# Patient Record
Sex: MALE | Race: ASIAN | HISPANIC OR LATINO
[De-identification: names, ages, dates, MRNs, and addresses within clinical notes are randomized per-mention and may not be internally consistent; named-entity substitution may affect disease eponyms.]

---

## 2019-05-06 ENCOUNTER — APPOINTMENT (OUTPATIENT)
Dept: UROLOGY | Facility: CLINIC | Age: 54
End: 2019-05-06
Payer: COMMERCIAL

## 2019-05-06 VITALS
BODY MASS INDEX: 24.41 KG/M2 | TEMPERATURE: 98.1 F | WEIGHT: 143 LBS | HEIGHT: 64 IN | HEART RATE: 78 BPM | SYSTOLIC BLOOD PRESSURE: 119 MMHG | DIASTOLIC BLOOD PRESSURE: 69 MMHG

## 2019-05-06 DIAGNOSIS — Z80.9 FAMILY HISTORY OF MALIGNANT NEOPLASM, UNSPECIFIED: ICD-10-CM

## 2019-05-06 DIAGNOSIS — R39.12 POOR URINARY STREAM: ICD-10-CM

## 2019-05-06 LAB
ANION GAP SERPL CALC-SCNC: 13 MMOL/L
BUN SERPL-MCNC: 17 MG/DL
CALCIUM SERPL-MCNC: 9.1 MG/DL
CHLORIDE SERPL-SCNC: 106 MMOL/L
CO2 SERPL-SCNC: 25 MMOL/L
CREAT SERPL-MCNC: 1.05 MG/DL
GLUCOSE SERPL-MCNC: 91 MG/DL
POTASSIUM SERPL-SCNC: 4 MMOL/L
PSA FREE FLD-MCNC: 14 %
PSA FREE SERPL-MCNC: 0.43 NG/ML
PSA SERPL-MCNC: 3.05 NG/ML
SODIUM SERPL-SCNC: 144 MMOL/L

## 2019-05-06 PROCEDURE — 99204 OFFICE O/P NEW MOD 45 MIN: CPT

## 2019-05-06 NOTE — PHYSICAL EXAM
[General Appearance - Well Nourished] : well nourished [General Appearance - Well Developed] : well developed [General Appearance - In No Acute Distress] : no acute distress [Normal Appearance] : normal appearance [Well Groomed] : well groomed [Edema] : no peripheral edema [Respiration, Rhythm And Depth] : normal respiratory rhythm and effort [Exaggerated Use Of Accessory Muscles For Inspiration] : no accessory muscle use [Abdomen Tenderness] : non-tender [Costovertebral Angle Tenderness] : no ~M costovertebral angle tenderness [Abdomen Soft] : soft [Urinary Bladder Findings] : the bladder was normal on palpation [Urethral Meatus] : meatus normal [Scrotum] : the scrotum was normal [Testes Mass (___cm)] : there were no testicular masses [No Prostate Nodules] : no prostate nodules [Normal Station and Gait] : the gait and station were normal for the patient's age [] : no rash [Oriented To Time, Place, And Person] : oriented to person, place, and time [No Focal Deficits] : no focal deficits [Affect] : the affect was normal [Mood] : the mood was normal [Not Anxious] : not anxious [No Palpable Adenopathy] : no palpable adenopathy

## 2019-05-11 NOTE — ADDENDUM
[FreeTextEntry1] : Entered by Annia Corona, acting as scribe for Dr. Rothman.\par  \par The documentation recorded by the scribe accurately reflects the service I personally performed and the decisions made by me.\par

## 2019-05-11 NOTE — LETTER BODY
[FreeTextEntry1] : No PCP\par \par Reason for Visit: BPH.\par \par This is a 54-year-old gentleman with symptoms of BPH. Patient is here today for evaluation. Patient reports he has weak uroflow, frequency, and hesitancy. Patient reports symptoms have worsened over the last month, with slower stream and worse urgency. He reports nocturia 5-10x per night. Patient reports requiring double or triple voiding at times. He denies any hematuria or urinary incontinence. His symptoms are aggravated by hydration. He denies any alleviating factors. He has been on Tamsulosin for the past 4-5 years, without improvement. He reports no pain. All other review of systems are negative. Patient reports prostate cancer on paternal side of family. All other past medical history, family history, and social history were inquired and were noncontributory to patient current condition. Medications and allergies were reviewed.\par \par On examination, the patient is a healthy-appearing gentleman in no acute distress. He is alert and oriented follows commands. He has normal mood and affect. He is normocephalic. Neck is supple. Oral no thrush Respirations are unlabored. His abdomen is soft and nontender. Bladder is nonpalpable. No CVA tenderness. Neurologically he is grossly intact. No peripheral edema. Skin without gross abnormality. He has normal male external genitalia. Normal meatus. Bilateral testes are descended intrascrotally and normal to palpation. On rectal examination, there is normal sphincter tone. The prostate is clinically benign without focal induration or nodularity.\par \par Post-void residual on bladder scan today was 55 cc.\par \par ASSESSMENT: BPH\par \par I counseled the patient on the various etiology of his symptoms. I discussed the natural history of BPH and the treatment options available. I discussed the options of conservative management with fluid in dietary restrictions, herbal therapy, medical therapy, and minimally invasive procedures.  Risk and benefits were discussed. I answered his questions. Patient will proceed with urodynamics and cystoscopy. Patient will obtain PSA and BMP. I recommended he continue Flomax, which I renewed today. I also recommend patient try Proscar. I discussed the potential side effects of the medication. I counseled the patient on its use and side effects. If the patient develops any side effects, the patient will discontinue the medication and contact me. Risks and alternatives were discussed. I answered the patient questions. The patient will follow-up as directed and will contact me with any questions or concerns. \par \par Plan: Urodynamics with cystoscopy. PSA. BMP. Continue Flomax. Trial of Proscar. Followup 1 month

## 2019-06-20 ENCOUNTER — APPOINTMENT (OUTPATIENT)
Dept: UROLOGY | Facility: CLINIC | Age: 54
End: 2019-06-20
Payer: COMMERCIAL

## 2019-06-20 ENCOUNTER — OUTPATIENT (OUTPATIENT)
Dept: OUTPATIENT SERVICES | Facility: HOSPITAL | Age: 54
LOS: 1 days | End: 2019-06-20
Payer: COMMERCIAL

## 2019-06-20 DIAGNOSIS — R35.0 FREQUENCY OF MICTURITION: ICD-10-CM

## 2019-06-20 PROCEDURE — 52000 CYSTOURETHROSCOPY: CPT

## 2019-06-20 PROCEDURE — 51798 US URINE CAPACITY MEASURE: CPT

## 2019-06-20 PROCEDURE — 51797 INTRAABDOMINAL PRESSURE TEST: CPT | Mod: 26

## 2019-06-20 PROCEDURE — 51728 CYSTOMETROGRAM W/VP: CPT | Mod: 26

## 2019-06-20 PROCEDURE — 51784 ANAL/URINARY MUSCLE STUDY: CPT | Mod: 26

## 2019-06-20 PROCEDURE — 51741 ELECTRO-UROFLOWMETRY FIRST: CPT

## 2019-06-20 PROCEDURE — 51797 INTRAABDOMINAL PRESSURE TEST: CPT

## 2019-06-20 PROCEDURE — 51741 ELECTRO-UROFLOWMETRY FIRST: CPT | Mod: 26

## 2019-06-20 PROCEDURE — 51784 ANAL/URINARY MUSCLE STUDY: CPT

## 2019-06-20 PROCEDURE — 99213 OFFICE O/P EST LOW 20 MIN: CPT | Mod: 25

## 2019-06-20 PROCEDURE — 51728 CYSTOMETROGRAM W/VP: CPT

## 2019-06-20 NOTE — LETTER BODY
[FreeTextEntry1] : No PCP\par \par Reason for Visit: BPH.\par \par This is a 54-year-old gentleman with symptoms of BPH. Patient reports he has weak uroflow, frequency, and hesitancy. Patient reports symptoms have worsened over the last month, with slower stream and worse urgency. He reports nocturia 5-10x per night. Patient reports requiring double or triple voiding at times. Patient returns today for UDS. Since his last visit, he reports taking Flomax BID and Proscar regularly without any side effects or difficulties with the medication. He reports weak urinary flow with medication. He denies any hematuria or urinary incontinence. His symptoms are aggravated by hydration. He denies any alleviating factors. He has been on Tamsulosin for the past 4-5 years, without improvement. He reports no pain. All other review of systems are negative. Patient reports prostate cancer on paternal side of family. All other past medical history, family history, and social history were inquired and were noncontributory to patient current condition. Medications and allergies were reviewed.\par \par On examination, the patient is a healthy-appearing gentleman in no acute distress. He is alert and oriented follows commands. He has normal mood and affect. He is normocephalic. Neck is supple. Oral no thrush Respirations are unlabored. His abdomen is soft and nontender. Bladder is nonpalpable. No CVA tenderness. Neurologically he is grossly intact. No peripheral edema. Skin without gross abnormality. He has normal male external genitalia. Normal meatus. Bilateral testes are descended intrascrotally and normal to palpation. On rectal examination, there is normal sphincter tone. The prostate is clinically benign without focal induration or nodularity.\par \par Post-void residual on bladder scan today was 60 cc.\par His UDS demonstrated median lobe hypertrophy.\par \par ASSESSMENT: BPH.\par \par I counseled the patient. His UDS demonstrated median lobe hypotrophy. His PVR today was 60 cc. He has persistent weak urinary flow. Patient declines surgery today. He will continue Flomax BID and Proscar as directed. I renewed his prescription for medication today. Risks and alternatives were discussed. I answered the patient questions. The patient will follow-up as directed and will contact me with any questions or concerns. Thank you for the opportunity to participate in the care of Mr. DIAS. I will keep you updated on his progress. \par \par Plan: Continue Flomax BID and Proscar. Followup 6 months.

## 2019-06-20 NOTE — ADDENDUM
[FreeTextEntry1] : Entered by Erica iRcci, acting as scribe for Dr. Kyle Rothman.\par \par The documentation recorded by the scribe accurately reflects the service I personally performed and the decisions made by me.

## 2019-06-22 DIAGNOSIS — N40.1 BENIGN PROSTATIC HYPERPLASIA WITH LOWER URINARY TRACT SYMPTOMS: ICD-10-CM

## 2019-06-22 DIAGNOSIS — R35.1 NOCTURIA: ICD-10-CM

## 2020-01-09 ENCOUNTER — APPOINTMENT (OUTPATIENT)
Dept: UROLOGY | Facility: CLINIC | Age: 55
End: 2020-01-09
Payer: COMMERCIAL

## 2020-01-09 PROCEDURE — 51798 US URINE CAPACITY MEASURE: CPT

## 2020-01-09 PROCEDURE — 99214 OFFICE O/P EST MOD 30 MIN: CPT | Mod: 25

## 2020-01-09 NOTE — ADDENDUM
[FreeTextEntry1] : Entered by Hank Law, acting as scribe for Dr. Kyle Rothman.\par \par The documentation recorded by the scribe accurately reflects the service I personally performed and the decisions made by me.

## 2020-01-09 NOTE — LETTER BODY
[FreeTextEntry1] : The patient does not have a PCP.\par \par Reason for Visit: BPH.\par \par This is a 54 year-old gentleman with BPH. His previous urodynamics testing with cystoscopy in June 2019 demonstrated median lobe hypertrophy. He returns today for follow up. Since his last visit, he continues to take Flomax BID and Proscar regularly without any side effects or difficulties with the medication. He reports of stable urinary symptoms with medication. He denies any hematuria or urinary incontinence. He has been on Tamsulosin for the past 4-5 years, without improvement. He has no pain. He denies any changes in health. The past medical history, family history and social history are unchanged. All other review of systems are negative. Patient denies any changes in medications. Medication list was reconciled. He has no allergies to medication.\par \par On examination, the patient is a healthy-appearing gentleman in no acute distress. He is alert and oriented follows commands. He has normal mood and affect. He is normocephalic. Neck is supple. Oral no thrush Respirations are unlabored. His abdomen is soft and nontender. Bladder is nonpalpable. No CVA tenderness. Neurologically he is grossly intact. No peripheral edema. Skin without gross abnormality. He has normal male external genitalia. Normal meatus. Bilateral testes are descended intrascrotally and normal to palpation. On rectal examination, there is normal sphincter tone. The prostate is clinically benign without focal induration or nodularity.\par \par His UDS in June 2019 demonstrated median lobe hypertrophy.\par \par Post-void residual on bladder scan today was 50 cc. \par \par ASSESSMENT: BPH, symptoms stable on Flomax BID and Proscar.\par \par I counseled the patient. His PVR today was 50 cc. He reports of stable urinary symptoms with medication. I renewed the patient's prescription for Flomax BID and Proscar today. I encouraged the patient to continue medications regularly as directed. Patient understands that if he develops gross hematuria or any urinary discomfort, he will contact me for further evaluation. Risks and alternatives were discussed. I answered the patient questions. He will obtain BMP and PSA today to ensure stability. The patient will follow-up as directed and will contact me with any questions or concerns. Thank you for the opportunity to participate in the care of Mr. DIAS. I will keep you updated on his progress. \par \par Plan: Continue Flomax BID and Proscar. BMP. PSA. Follow up in 1 year.

## 2020-01-10 LAB
ANION GAP SERPL CALC-SCNC: 11 MMOL/L
BUN SERPL-MCNC: 19 MG/DL
CALCIUM SERPL-MCNC: 9.5 MG/DL
CHLORIDE SERPL-SCNC: 104 MMOL/L
CO2 SERPL-SCNC: 26 MMOL/L
CREAT SERPL-MCNC: 0.99 MG/DL
GLUCOSE SERPL-MCNC: 98 MG/DL
POTASSIUM SERPL-SCNC: 4.1 MMOL/L
PSA FREE FLD-MCNC: 13 %
PSA FREE SERPL-MCNC: 0.17 NG/ML
PSA SERPL-MCNC: 1.34 NG/ML
SODIUM SERPL-SCNC: 141 MMOL/L

## 2020-04-25 ENCOUNTER — MESSAGE (OUTPATIENT)
Age: 55
End: 2020-04-25

## 2020-05-07 LAB
SARS-COV-2 IGG SERPL IA-ACNC: <0.1 INDEX
SARS-COV-2 IGG SERPL QL IA: NEGATIVE

## 2021-01-03 ENCOUNTER — TRANSCRIPTION ENCOUNTER (OUTPATIENT)
Age: 56
End: 2021-01-03

## 2021-01-07 ENCOUNTER — APPOINTMENT (OUTPATIENT)
Dept: UROLOGY | Facility: CLINIC | Age: 56
End: 2021-01-07
Payer: COMMERCIAL

## 2021-01-07 LAB
ANION GAP SERPL CALC-SCNC: 15 MMOL/L
BUN SERPL-MCNC: 18 MG/DL
CALCIUM SERPL-MCNC: 9.6 MG/DL
CHLORIDE SERPL-SCNC: 101 MMOL/L
CO2 SERPL-SCNC: 24 MMOL/L
CREAT SERPL-MCNC: 1.12 MG/DL
GLUCOSE SERPL-MCNC: 92 MG/DL
POTASSIUM SERPL-SCNC: 4 MMOL/L
PSA FREE FLD-MCNC: 15 %
PSA FREE SERPL-MCNC: 0.2 NG/ML
PSA SERPL-MCNC: 1.33 NG/ML
SODIUM SERPL-SCNC: 139 MMOL/L

## 2021-01-07 PROCEDURE — 99214 OFFICE O/P EST MOD 30 MIN: CPT | Mod: 25

## 2021-01-07 PROCEDURE — 99072 ADDL SUPL MATRL&STAF TM PHE: CPT

## 2021-01-07 PROCEDURE — 51798 US URINE CAPACITY MEASURE: CPT

## 2021-01-07 NOTE — ADDENDUM
[FreeTextEntry1] : Entered by Cristobal Andre, acting as scribe for Dr. Kyle Rothman.\par \par The documentation recorded by the scribe accurately reflects the service I personally performed and the decisions made by me.\par

## 2021-01-07 NOTE — LETTER BODY
[FreeTextEntry1] : Rainer Alaniz MD\par [address],\[place] [address]\par [phone]\par \par Dear Dr. Alaniz,\par \par Reason for Visit: BPH.\par \par This is a 55 year-old gentleman with chronic BPH. His urodynamics testing with cystoscopy in June 2019 demonstrated median lobe hypertrophy. The patient returns today for follow-up. Since he was last seen, the patient reports that he continues to take Flomax BID and Proscar regularly without any side effects or difficulties. The patient reports of persistent urinary symptoms despite medical therapy. He also notes weak uroflow. Patient expresses interest in UroLift procedure. He denies any hematuria or urinary incontinence. Patient has been on Tamsulosin for the past 4-5 years, without improvement. He denies any pain. Patient is single with no children. He expresses concern for his fertility. The patient denies any changes in health. The past medical history, family history and social history are unchanged. All other review of systems are negative. Patient denies any changes in medications. Medication list was reconciled. He has no allergies to medication.\par \par On examination, the patient is a healthy-appearing gentleman in no acute distress. He is alert and oriented follows commands. He has normal mood and affect. He is normocephalic. Neck is supple. Oral no thrush Respirations are unlabored. His abdomen is soft and nontender. Bladder is nonpalpable. No CVA tenderness. Neurologically he is grossly intact. No peripheral edema. Skin without gross abnormality. He has normal male external genitalia. Normal meatus. Bilateral testes are descended intrascrotally and normal to palpation. On rectal examination, there is normal sphincter tone. The prostate is clinically benign without focal induration or nodularity.\par \par Post-void residual on bladder scan today was 80 cc. \par \par ASSESSMENT: BPH, persistent symptoms on Flomax BID and Proscar.\par \par I counseled the patient. The patient's PVR today was 80 cc. In terms of his BPH, I recommended the patient continue taking Flomax BID and Proscar. I renewed the patient's prescription for Flomax and Proscar today. I encouraged the patient to continue medications regularly as directed. Given his interest in UroLift, I counseled the patient regarding the procedure. The risks and benefits were discussed. Alternatives were given. I answered the patient questions. The patient will consider the procedure. Risks and alternatives were discussed. I answered the patient questions. The patient will follow-up in 3 months and will contact me with any questions or concerns. Thank you for the opportunity to participate in the care of Mr. DIAS. I will keep you updated on his progress.\par \par Plan: Continue Flomax BID and Proscar. BMP. PSA. Consider UroLift. Follow-up in 3 months.

## 2021-04-15 ENCOUNTER — APPOINTMENT (OUTPATIENT)
Dept: UROLOGY | Facility: CLINIC | Age: 56
End: 2021-04-15
Payer: COMMERCIAL

## 2021-04-15 VITALS
SYSTOLIC BLOOD PRESSURE: 159 MMHG | RESPIRATION RATE: 15 BRPM | TEMPERATURE: 97.8 F | HEART RATE: 74 BPM | DIASTOLIC BLOOD PRESSURE: 87 MMHG

## 2021-04-15 PROCEDURE — 99072 ADDL SUPL MATRL&STAF TM PHE: CPT

## 2021-04-15 PROCEDURE — 99214 OFFICE O/P EST MOD 30 MIN: CPT

## 2021-04-15 RX ORDER — FINASTERIDE 5 MG/1
5 TABLET, FILM COATED ORAL DAILY
Qty: 90 | Refills: 3 | Status: DISCONTINUED | COMMUNITY
Start: 2019-05-06 | End: 2021-04-15

## 2021-04-15 NOTE — LETTER BODY
[FreeTextEntry1] : Rainer Alaniz MD\par [address],\[place] [address]\par [phone]\par \par Dear Dr. Alaniz,\par \par Reason for Visit: BPH.\par \par This is a 56 year-old gentleman with chronic BPH. His urodynamics testing with cystoscopy in June 2019 demonstrated median lobe hypertrophy. He returns today for follow-up. Since his last visit, the patient stopped taking Proscar, and he is only taking Flomax QD. Patient notes minimal changes in his urine flow, and urinary symptoms after making these changes. He notes stable urine flow and stable urinary symptoms on Flomax QD. He denies any pain, hematuria, or urinary incontinence. Patient has been on Tamsulosin for the past 4-5 years, without improvement. The patient denies any changes in health. The past medical history, family history and social history are unchanged. All other review of systems are negative. Patient denies any changes in medications. Medication list was reconciled. He has no allergies to medication.\par \par On examination, the patient is a healthy-appearing gentleman in no acute distress. He is alert and oriented follows commands. He has normal mood and affect. He is normocephalic. Neck is supple. Oral no thrush Respirations are unlabored. His abdomen is soft and nontender. Bladder is nonpalpable. No CVA tenderness. Neurologically he is grossly intact. No peripheral edema. Skin without gross abnormality. He has normal male external genitalia. Normal meatus. Bilateral testes are descended intrascrotally and normal to palpation. On rectal examination, there is normal sphincter tone. The prostate is clinically benign without focal induration or nodularity.\par \par His BMP demonstrated normal renal functions, creatinine 1.12. His PSA was 1.33, which is within normal limits.\par \par ASSESSMENT: BPH, stable symptoms on Flomax QD.\par \par I counseled the patient. In terms of his BPH, I recommended the patient continue taking Flomax QD. I renewed the patient's prescription for Flomax today. I encouraged the patient to continue medications regularly as directed. Patient understands that if he develops gross hematuria or any urinary discomfort, he will contact me for further evaluation. Risks and alternatives were discussed. I answered the patient questions. The patient will follow-up as directed and will contact me with any questions or concerns. Thank you for the opportunity to participate in the care of Mr. DIAS. I will keep you updated on his progress.\par \par Plan: Continue Flomax. Follow-up as directed.\par \par I spent 30-minutes time today on all issues related to this patient on today date of service including non face to face time.

## 2021-10-26 ENCOUNTER — NON-APPOINTMENT (OUTPATIENT)
Age: 56
End: 2021-10-26

## 2021-11-08 ENCOUNTER — APPOINTMENT (OUTPATIENT)
Dept: UROLOGY | Facility: CLINIC | Age: 56
End: 2021-11-08
Payer: COMMERCIAL

## 2021-11-08 DIAGNOSIS — R39.11 HESITANCY OF MICTURITION: ICD-10-CM

## 2021-11-08 LAB
ANION GAP SERPL CALC-SCNC: 14 MMOL/L
BUN SERPL-MCNC: 21 MG/DL
CALCIUM SERPL-MCNC: 9.2 MG/DL
CHLORIDE SERPL-SCNC: 104 MMOL/L
CO2 SERPL-SCNC: 23 MMOL/L
CREAT SERPL-MCNC: 1.06 MG/DL
GLUCOSE SERPL-MCNC: 84 MG/DL
POTASSIUM SERPL-SCNC: 4.3 MMOL/L
PSA FREE FLD-MCNC: 13 %
PSA FREE SERPL-MCNC: 0.31 NG/ML
PSA SERPL-MCNC: 2.41 NG/ML
SODIUM SERPL-SCNC: 141 MMOL/L

## 2021-11-08 PROCEDURE — 51798 US URINE CAPACITY MEASURE: CPT

## 2021-11-08 PROCEDURE — 99213 OFFICE O/P EST LOW 20 MIN: CPT

## 2021-11-08 NOTE — HISTORY OF PRESENT ILLNESS
[FreeTextEntry1] : Please refer to URO Consult note \par \par FUA BPH \par on Flomax \par stable symptoms \par PVR 30\par follow up in 1 year \par

## 2021-11-08 NOTE — LETTER BODY
[FreeTextEntry1] : Rainer Alaniz MD\par [address],\[place] [address]\par [phone]\par \par Dear Dr. Alaniz,\par \par Reason for Visit: BPH.\par \par This is a 56 year-old gentleman with chronic BPH. His urodynamics testing with cystoscopy in June 2019 demonstrated median lobe hypertrophy. He returns today for follow-up. Since his last visit, the patient resumed Proscar after developing progressive urinary symptoms. He is currently taking Flomax QD and Proscar without any side effects or difficulties. Patient notes improved urine flow, and urinary symptoms with both medications.  He denies any pain, hematuria, or urinary incontinence.  The patient denies any changes in health. The past medical history, family history and social history are unchanged. All other review of systems are negative. Patient denies any changes in medications. Medication list was reconciled. He has no allergies to medication.\par \par On examination, the patient is a healthy-appearing gentleman in no acute distress. He is alert and oriented follows commands. He has normal mood and affect. He is normocephalic. Neck is supple. Oral no thrush Respirations are unlabored. His abdomen is soft and nontender. Bladder is nonpalpable. No CVA tenderness. Neurologically he is grossly intact. No peripheral edema. Skin without gross abnormality. He has normal male external genitalia. Normal meatus. Bilateral testes are descended intrascrotally and normal to palpation. On rectal examination, there is normal sphincter tone. The prostate is clinically benign without focal induration or nodularity.\par \par His BMP from January demonstrated normal renal functions, creatinine 1.12. His PSA was 1.33, which is within normal limits.\par \par Post-void residual on bladder scan today was 30 cc. \par \par ASSESSMENT: BPH, stable symptoms on Flomax QD and Proscar. \par \par I counseled the patient. He is doing well. His PVR is 30 cc.  In terms of his BPH, the patient notes improved urinary symptoms on medical therapy. I recommended the patient continue taking Flomax QD and Proscar. I renewed the patient's prescription for Flomax today. I encouraged the patient to continue medications regularly as directed. He will repeat PSA and BMP today to ensure stability. Patient understands that if he develops gross hematuria or any urinary discomfort, he will contact me for further evaluation. Risks and alternatives were discussed. I answered the patient questions. The patient will follow-up as directed and will contact me with any questions or concerns. Thank you for the opportunity to participate in the care of Mr. DIAS. I will keep you updated on his progress.\par \par Plan: Continue Flomax and Proscar. BMP. PSA.  Follow-up in 1 year.

## 2021-11-08 NOTE — ADDENDUM
[FreeTextEntry1] : Entered by Sharif Pearce, acting as scribe for Dr. Kyle Rothman.\par \par The documentation recorded by the scribe accurately reflects the service I personally performed and the decisions made by me.

## 2022-04-18 ENCOUNTER — APPOINTMENT (OUTPATIENT)
Dept: UROLOGY | Facility: CLINIC | Age: 57
End: 2022-04-18

## 2022-07-21 ENCOUNTER — NON-APPOINTMENT (OUTPATIENT)
Age: 57
End: 2022-07-21

## 2022-11-17 ENCOUNTER — APPOINTMENT (OUTPATIENT)
Dept: UROLOGY | Facility: CLINIC | Age: 57
End: 2022-11-17

## 2022-11-17 DIAGNOSIS — R35.1 NOCTURIA: ICD-10-CM

## 2022-11-17 DIAGNOSIS — Z00.00 ENCOUNTER FOR GENERAL ADULT MEDICAL EXAMINATION W/OUT ABNORMAL FINDINGS: ICD-10-CM

## 2022-11-17 LAB
ANION GAP SERPL CALC-SCNC: 12 MMOL/L
BUN SERPL-MCNC: 16 MG/DL
CALCIUM SERPL-MCNC: 9.7 MG/DL
CHLORIDE SERPL-SCNC: 104 MMOL/L
CO2 SERPL-SCNC: 24 MMOL/L
CREAT SERPL-MCNC: 0.99 MG/DL
EGFR: 89 ML/MIN/1.73M2
GLUCOSE SERPL-MCNC: 93 MG/DL
POTASSIUM SERPL-SCNC: 4.2 MMOL/L
PSA FREE FLD-MCNC: 13 %
PSA FREE SERPL-MCNC: 0.26 NG/ML
PSA SERPL-MCNC: 1.99 NG/ML
SODIUM SERPL-SCNC: 140 MMOL/L

## 2022-11-17 PROCEDURE — 99213 OFFICE O/P EST LOW 20 MIN: CPT

## 2022-11-17 NOTE — ADDENDUM
[FreeTextEntry1] : Entered by Beatriz Pettit, acting as scribe for Dr. Kyle Rothman.\par The documentation recorded by the scribe accurately reflects the service I personally performed and the decisions made by me.

## 2022-11-17 NOTE — LETTER BODY
[FreeTextEntry1] : Rainer Alaniz MD\par [address],\[place] [address]\par [phone]\par \par Dear Dr. Alaniz,\par \par Reason for Visit: BPH.\par \par This is a 57 year-old gentleman with chronic BPH. His urodynamics testing with cystoscopy in June 2019 demonstrated median lobe hypertrophy. He returns today for follow-up. Since his last visit, he is currently taking Flomax QD and Proscar without any side effects or difficulties. Patient notes improved urine flow, and urinary symptoms with both medications.  He denies any pain, hematuria, or urinary incontinence.  The patient denies any changes in health. The past medical history, family history and social history are unchanged. All other review of systems are negative. Patient denies any changes in medications. Medication list was reconciled. He has no allergies to medication.\par \par On examination, the patient is a healthy-appearing gentleman in no acute distress. He is alert and oriented follows commands. He has normal mood and affect. He is normocephalic. Neck is supple. Oral no thrush Respirations are unlabored. His abdomen is soft and nontender. Bladder is nonpalpable. No CVA tenderness. Neurologically he is grossly intact. No peripheral edema. Skin without gross abnormality. He has normal male external genitalia. Normal meatus. Bilateral testes are descended intrascrotally and normal to palpation. On rectal examination, there is normal sphincter tone. The prostate is clinically benign without focal induration or nodularity.\par \par His BMP from November 2021 demonstrated normal renal functions, creatinine 1.06. His PSA was 2.41, which is within normal limits.\par \par ASSESSMENT: BPH, stable symptoms on Flomax QD and Proscar. \par \par I counseled the patient. He is doing well. In terms of his BPH, the patient notes improved urinary symptoms on medical therapy. I recommended the patient continue taking Flomax QD and Proscar. I renewed the patient's prescription for Flomax today. I encouraged the patient to continue medications regularly as directed. He will repeat PSA and BMP today to ensure stability. Patient understands that if he develops gross hematuria or any urinary discomfort, he will contact me for further evaluation. Risks and alternatives were discussed. I answered the patient questions. The patient will follow-up as directed and will contact me with any questions or concerns. Thank you for the opportunity to participate in the care of Mr. DIAS. I will keep you updated on his progress.\par \par Plan: Continue Flomax and Proscar. BMP. PSA.  Follow-up in 1 year.

## 2023-10-09 ENCOUNTER — NON-APPOINTMENT (OUTPATIENT)
Age: 58
End: 2023-10-09

## 2023-10-11 ENCOUNTER — APPOINTMENT (OUTPATIENT)
Dept: HEART AND VASCULAR | Facility: CLINIC | Age: 58
End: 2023-10-11
Payer: COMMERCIAL

## 2023-10-11 ENCOUNTER — NON-APPOINTMENT (OUTPATIENT)
Age: 58
End: 2023-10-11

## 2023-10-11 VITALS
OXYGEN SATURATION: 97 % | BODY MASS INDEX: 24.07 KG/M2 | DIASTOLIC BLOOD PRESSURE: 89 MMHG | SYSTOLIC BLOOD PRESSURE: 125 MMHG | TEMPERATURE: 98.9 F | HEIGHT: 64 IN | WEIGHT: 140.99 LBS | HEART RATE: 87 BPM

## 2023-10-11 DIAGNOSIS — R94.31 ABNORMAL ELECTROCARDIOGRAM [ECG] [EKG]: ICD-10-CM

## 2023-10-11 DIAGNOSIS — Z78.9 OTHER SPECIFIED HEALTH STATUS: ICD-10-CM

## 2023-10-11 DIAGNOSIS — Z82.49 FAMILY HISTORY OF ISCHEMIC HEART DISEASE AND OTHER DISEASES OF THE CIRCULATORY SYSTEM: ICD-10-CM

## 2023-10-11 PROCEDURE — 93000 ELECTROCARDIOGRAM COMPLETE: CPT

## 2023-10-11 PROCEDURE — 99203 OFFICE O/P NEW LOW 30 MIN: CPT

## 2023-10-15 ENCOUNTER — OUTPATIENT (OUTPATIENT)
Dept: OUTPATIENT SERVICES | Facility: HOSPITAL | Age: 58
LOS: 1 days | End: 2023-10-15
Payer: COMMERCIAL

## 2023-10-15 ENCOUNTER — APPOINTMENT (OUTPATIENT)
Dept: MRI IMAGING | Facility: HOSPITAL | Age: 58
End: 2023-10-15

## 2023-10-15 PROCEDURE — 70553 MRI BRAIN STEM W/O & W/DYE: CPT | Mod: 26

## 2023-10-15 PROCEDURE — A9585: CPT

## 2023-10-15 PROCEDURE — 70553 MRI BRAIN STEM W/O & W/DYE: CPT

## 2023-11-06 ENCOUNTER — APPOINTMENT (OUTPATIENT)
Dept: HEART AND VASCULAR | Facility: CLINIC | Age: 58
End: 2023-11-06
Payer: COMMERCIAL

## 2023-11-06 VITALS
BODY MASS INDEX: 24.59 KG/M2 | TEMPERATURE: 97.2 F | WEIGHT: 144 LBS | RESPIRATION RATE: 16 BRPM | HEIGHT: 64 IN | SYSTOLIC BLOOD PRESSURE: 128 MMHG | OXYGEN SATURATION: 97 % | HEART RATE: 97 BPM | DIASTOLIC BLOOD PRESSURE: 90 MMHG

## 2023-11-06 PROCEDURE — 93351 STRESS TTE COMPLETE: CPT

## 2023-11-06 PROCEDURE — 99213 OFFICE O/P EST LOW 20 MIN: CPT

## 2023-11-30 ENCOUNTER — APPOINTMENT (OUTPATIENT)
Dept: UROLOGY | Facility: CLINIC | Age: 58
End: 2023-11-30
Payer: COMMERCIAL

## 2023-11-30 DIAGNOSIS — E78.5 HYPERLIPIDEMIA, UNSPECIFIED: ICD-10-CM

## 2023-11-30 DIAGNOSIS — N40.1 BENIGN PROSTATIC HYPERPLASIA WITH LOWER URINARY TRACT SYMPMS: ICD-10-CM

## 2023-11-30 DIAGNOSIS — R06.09 OTHER FORMS OF DYSPNEA: ICD-10-CM

## 2023-11-30 DIAGNOSIS — R01.1 CARDIAC MURMUR, UNSPECIFIED: ICD-10-CM

## 2023-11-30 LAB
ANION GAP SERPL CALC-SCNC: 13 MMOL/L
BUN SERPL-MCNC: 21 MG/DL
CALCIUM SERPL-MCNC: 9.2 MG/DL
CHLORIDE SERPL-SCNC: 104 MMOL/L
CO2 SERPL-SCNC: 23 MMOL/L
CREAT SERPL-MCNC: 1.01 MG/DL
EGFR: 86 ML/MIN/1.73M2
GLUCOSE SERPL-MCNC: 85 MG/DL
POTASSIUM SERPL-SCNC: 4.2 MMOL/L
SODIUM SERPL-SCNC: 140 MMOL/L

## 2023-11-30 PROCEDURE — 99213 OFFICE O/P EST LOW 20 MIN: CPT

## 2023-12-01 LAB
PSA FREE FLD-MCNC: 12 %
PSA FREE SERPL-MCNC: 0.21 NG/ML
PSA SERPL-MCNC: 1.72 NG/ML

## 2023-12-23 ENCOUNTER — NON-APPOINTMENT (OUTPATIENT)
Age: 58
End: 2023-12-23

## 2024-03-28 ENCOUNTER — RX RENEWAL (OUTPATIENT)
Age: 59
End: 2024-03-28

## 2024-03-28 RX ORDER — TAMSULOSIN HYDROCHLORIDE 0.4 MG/1
0.4 CAPSULE ORAL
Qty: 90 | Refills: 2 | Status: ACTIVE | COMMUNITY
Start: 1900-01-01 | End: 1900-01-01

## 2024-03-28 RX ORDER — FINASTERIDE 5 MG/1
5 TABLET, FILM COATED ORAL DAILY
Qty: 90 | Refills: 2 | Status: ACTIVE | COMMUNITY
Start: 2021-10-26 | End: 1900-01-01

## 2024-10-05 ENCOUNTER — RX RENEWAL (OUTPATIENT)
Age: 59
End: 2024-10-05

## 2024-11-25 ENCOUNTER — APPOINTMENT (OUTPATIENT)
Dept: HEART AND VASCULAR | Facility: CLINIC | Age: 59
End: 2024-11-25

## 2024-12-05 ENCOUNTER — APPOINTMENT (OUTPATIENT)
Dept: UROLOGY | Facility: CLINIC | Age: 59
End: 2024-12-05
Payer: COMMERCIAL

## 2024-12-05 DIAGNOSIS — R39.12 POOR URINARY STREAM: ICD-10-CM

## 2024-12-05 DIAGNOSIS — R39.11 HESITANCY OF MICTURITION: ICD-10-CM

## 2024-12-05 DIAGNOSIS — N40.1 BENIGN PROSTATIC HYPERPLASIA WITH LOWER URINARY TRACT SYMPMS: ICD-10-CM

## 2024-12-05 PROCEDURE — G2211 COMPLEX E/M VISIT ADD ON: CPT

## 2024-12-05 PROCEDURE — 99214 OFFICE O/P EST MOD 30 MIN: CPT

## 2025-01-06 ENCOUNTER — NON-APPOINTMENT (OUTPATIENT)
Age: 60
End: 2025-01-06

## 2025-01-06 ENCOUNTER — APPOINTMENT (OUTPATIENT)
Dept: HEART AND VASCULAR | Facility: CLINIC | Age: 60
End: 2025-01-06
Payer: COMMERCIAL

## 2025-01-06 VITALS
SYSTOLIC BLOOD PRESSURE: 116 MMHG | OXYGEN SATURATION: 98 % | WEIGHT: 145 LBS | HEIGHT: 64 IN | HEART RATE: 84 BPM | DIASTOLIC BLOOD PRESSURE: 74 MMHG | BODY MASS INDEX: 24.75 KG/M2 | TEMPERATURE: 98.6 F

## 2025-01-06 DIAGNOSIS — R06.09 OTHER FORMS OF DYSPNEA: ICD-10-CM

## 2025-01-06 DIAGNOSIS — E78.5 HYPERLIPIDEMIA, UNSPECIFIED: ICD-10-CM

## 2025-01-06 PROCEDURE — 99214 OFFICE O/P EST MOD 30 MIN: CPT

## 2025-01-06 PROCEDURE — 93000 ELECTROCARDIOGRAM COMPLETE: CPT

## 2025-01-06 PROCEDURE — G2211 COMPLEX E/M VISIT ADD ON: CPT

## 2025-01-13 ENCOUNTER — APPOINTMENT (OUTPATIENT)
Dept: HEART AND VASCULAR | Facility: CLINIC | Age: 60
End: 2025-01-13
Payer: COMMERCIAL

## 2025-01-13 DIAGNOSIS — I77.810 THORACIC AORTIC ECTASIA: ICD-10-CM

## 2025-01-13 PROCEDURE — 93306 TTE W/DOPPLER COMPLETE: CPT

## 2025-01-14 ENCOUNTER — NON-APPOINTMENT (OUTPATIENT)
Age: 60
End: 2025-01-14

## 2025-01-14 PROBLEM — I77.810 AORTIC ROOT DILATATION: Status: ACTIVE | Noted: 2025-01-14

## 2025-09-17 ENCOUNTER — RX RENEWAL (OUTPATIENT)
Age: 60
End: 2025-09-17